# Patient Record
(demographics unavailable — no encounter records)

---

## 2024-10-28 NOTE — HISTORY OF PRESENT ILLNESS
[de-identified] : Initial visit. Chief complaint is "ear pain/blockage". She reports that she was in her otherwise state of fine health when she developed what sounds like an upper respiratory illness.  This was associated with a sinusitis and postnasal drip.  This developed 3 weeks ago initially.  She had multiple COVID test that were negative.  She more recently presented to urgent care where they recommended Augmentin and Claritin-D.  At this point she is complaining of ear pain bilaterally with blockage. Does not report a history of chronic ear problems. Does feel that moving her head around at times can make her dizzy. No tinnitus.

## 2024-10-28 NOTE — ASSESSMENT
[FreeTextEntry1] : She has air travel to [place] in 2 days. I explained to her that generally speaking I would treat her with another week of antibiotics which I will Augmentin. And a low-dose burst of prednisone. We do not recommend flying with an otitis media secondary to the possibility of increased pain and also the possibility of permanent sensorineural hearing loss.  She endorsed understanding this recommendation. I also discussed with her in office bilateral myringotomy and drainage today.  I warned her of the small possibility of persistent perforation.  She does not want to proceed in this manner. I recommend she discontinue Claritin-D. Recommend a 3 days of Afrin. I recommend follow-up in 2 weeks.

## 2024-11-08 NOTE — HISTORY OF PRESENT ILLNESS
[de-identified] : She comes in in follow-up today. She took medications as prescribed. She flew to [facility] and back. She feels 95% normal in terms of the pressure and discomfort in her left ear.  She is however reporting that she has a persistent cough. In the past when she has had a cough like this she is responded to "inhaler therapy".

## 2024-11-08 NOTE — ASSESSMENT
[FreeTextEntry1] : Resolved otitis media. For her cough I have recommended Flonase and Mucinex. I did explain her if the problem were to persist she should follow-up with her PCP for further evaluation including potential inhaler.